# Patient Record
Sex: FEMALE | Race: WHITE | NOT HISPANIC OR LATINO | Employment: FULL TIME | URBAN - METROPOLITAN AREA
[De-identification: names, ages, dates, MRNs, and addresses within clinical notes are randomized per-mention and may not be internally consistent; named-entity substitution may affect disease eponyms.]

---

## 2017-07-31 ENCOUNTER — HOSPITAL ENCOUNTER (OUTPATIENT)
Dept: MAMMOGRAPHY | Facility: CLINIC | Age: 52
Discharge: HOME | End: 2017-07-31
Attending: OBSTETRICS & GYNECOLOGY | Admitting: OBSTETRICS & GYNECOLOGY
Payer: COMMERCIAL

## 2017-07-31 DIAGNOSIS — Z12.31 VISIT FOR SCREENING MAMMOGRAM: ICD-10-CM

## 2017-07-31 PROCEDURE — G0202 SCR MAMMO BI INCL CAD: HCPCS

## 2017-08-10 ENCOUNTER — HOSPITAL ENCOUNTER (OUTPATIENT)
Dept: MAMMOGRAPHY | Facility: CLINIC | Age: 52
Discharge: HOME | End: 2017-08-10
Attending: OBSTETRICS & GYNECOLOGY | Admitting: OBSTETRICS & GYNECOLOGY
Payer: COMMERCIAL

## 2017-08-10 ENCOUNTER — HOSPITAL ENCOUNTER (OUTPATIENT)
Dept: ULTRASOUND IMAGING | Facility: CLINIC | Age: 52
End: 2017-08-10
Attending: OBSTETRICS & GYNECOLOGY
Payer: COMMERCIAL

## 2017-08-10 DIAGNOSIS — R92.8 ABNORMAL MAMMOGRAM: ICD-10-CM

## 2017-08-10 PROCEDURE — G0279 TOMOSYNTHESIS, MAMMO: HCPCS

## 2017-08-10 PROCEDURE — 76642 ULTRASOUND BREAST LIMITED: CPT | Mod: LT

## 2020-06-23 ENCOUNTER — HOSPITAL ENCOUNTER (EMERGENCY)
Facility: CLINIC | Age: 55
Discharge: HOME | End: 2020-06-23
Attending: EMERGENCY MEDICINE | Admitting: EMERGENCY MEDICINE
Payer: COMMERCIAL

## 2020-06-23 VITALS
HEIGHT: 62 IN | DIASTOLIC BLOOD PRESSURE: 111 MMHG | WEIGHT: 160 LBS | BODY MASS INDEX: 29.44 KG/M2 | RESPIRATION RATE: 16 BRPM | OXYGEN SATURATION: 98 % | SYSTOLIC BLOOD PRESSURE: 184 MMHG | TEMPERATURE: 97.5 F

## 2020-06-23 DIAGNOSIS — L25.9 CONTACT DERMATITIS, UNSPECIFIED CONTACT DERMATITIS TYPE, UNSPECIFIED TRIGGER: ICD-10-CM

## 2020-06-23 PROCEDURE — 99282 EMERGENCY DEPT VISIT SF MDM: CPT

## 2020-06-23 RX ORDER — TRIAMCINOLONE ACETONIDE 1 MG/G
CREAM TOPICAL 2 TIMES DAILY
Qty: 30 G | Refills: 1 | Status: SHIPPED | OUTPATIENT
Start: 2020-06-23 | End: 2020-07-03

## 2020-06-23 ASSESSMENT — ENCOUNTER SYMPTOMS
VOMITING: 0
FEVER: 0
DIARRHEA: 0
NAUSEA: 0
SHORTNESS OF BREATH: 0
CHILLS: 0

## 2020-06-23 ASSESSMENT — MIFFLIN-ST. JEOR: SCORE: 1264.01

## 2020-06-23 NOTE — ED AVS SNAPSHOT
[facility] Emergency Department  [address]  [facility] [address]  Phone:  [phone]  Fax:  [phone]                                    Chio Storey   MRN: [id number]    Department:  [facility] Emergency Department   Date of Visit:  6/23/2020           After Visit Summary Signature Page    I have received my discharge instructions, and my questions have been answered. I have discussed any challenges I see with this plan with the nurse or doctor.    ..........................................................................................................................................  Patient/Patient Representative Signature      ..........................................................................................................................................  Patient Representative Print Name and Relationship to Patient    ..................................................               ................................................  Date                                   Time    ..........................................................................................................................................  Reviewed by Signature/Title    ...................................................              ..............................................  Date                                               Time          22EPIC Rev 08/18

## 2020-06-24 NOTE — ED TRIAGE NOTES
Pt presents for evaluation of a rash with wound to right foot for about 1 week. Has been itchy, pt scratched area, which caused an open wound. Tonight, pt noticed small bumps to left foot. Has been in the yard with sandals. 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

## 2020-06-24 NOTE — ED PROVIDER NOTES
"  History     Chief Complaint:    Rash    The history is provided by the patient.      Chio Storey is a 55 year old female who presents with rash.  Patient states that 1 to 2 weeks prior she noted that there was a pruritic rash on the dorsum of her right foot.  The majority of the rash were small erythematous papules but there was a collection of papules together in which there was some degree of fluid accumulation.  Patient tried to \"pop it.\"  There was clear mikhail-colored fluid with no purulence.  The area has remained pruritic.  She now has the same erythematous rash to the dorsum of the left foot.  She placed hydrocortisone cream on the areas twice but no additional doses or medications.  She has been working in her backyard more often but no other obvious new skin irritants.  She denies that there are any rosebushes in her yard.  There are no animals that have scratched or bit her feet.    Allergies:  Sulfa Drugs     Medications:    Biotin    Past Medical History:    Allergic rhinitis  Rosacea     Past Surgical History:    The patient does not have any pertinent past surgical history.    Family History:    Allergies - father, sister, brother    Social History:  Negative for tobacco use.  Negative for alcohol use.  Negative for drug use.  Marital Status:   [4]     Review of Systems   Constitutional: Negative for chills and fever.   Respiratory: Negative for shortness of breath.    Gastrointestinal: Negative for diarrhea, nausea and vomiting.   Skin: Positive for rash.   All other systems reviewed and are negative.      Physical Exam     Patient Vitals for the past 24 hrs:   BP Temp Temp src Heart Rate Resp SpO2 Height Weight   06/23/20 2054 (!) 184/111 97.5  F (36.4  C) Oral 79 16 98 % 1.575 m (5' 2\") 72.6 kg (160 lb)     Physical Exam  Skin:               General:   Pleasant, age appropriate.  Eyes:    Conjunctiva normal  Neck:    Supple, no meningismus.     CV:     Regular rate and rhythm. "      No murmurs, rubs or gallops.       No  lower extremity edema.  PULM:    Clear to auscultation bilateral.       No respiratory distress.      Good air exchange.  ABD:    Soft, non-tender, non-distended.       No rebound, guarding or rigidity.  MSK:     No gross deformity to all four extremities.   LYMPH:   No cervical lymphadenopathy.  NEURO:   Alert, good muscular tone, no atrophy.   Skin:    Warm, dry  Psych:    Mood is good and affect is appropriate.      Emergency Department Course     Emergency Department Course:  Past medical records, nursing notes, and vitals reviewed.    2110: I performed an exam of the patient as documented above.     I personally reviewed the results with the Patient and answered all related questions prior to discharge.     Findings and plan explained to the Patient. Patient discharged home with instructions regarding supportive care, medications, and reasons to return. The importance of close follow-up was reviewed.     Impression & Plan     Medical Decision Making:    Chio Storey is a 55 year old female who presented to the ED with erythematous papules to the dorsum of her feet and ankles after working in her yard.  No evidence of cellulitis, Lyme disease, scabies, fungal infection, sporotrichosis.  Signs are most suggestive of contact dermatitis.  Patient will be discharged home on 10-day supply of triamcinolone.  Ranitidine and diphenhydramine as needed for pruritus.  Return to the ED for any worsening symptoms.      Diagnosis:    ICD-10-CM    1. Contact dermatitis, unspecified contact dermatitis type, unspecified trigger  L25.9      Disposition:  Discharged to home.    Discharge Medications:  Discharge Medication List as of 6/23/2020  9:23 PM      START taking these medications    Details   triamcinolone (KENALOG) 0.1 % external cream Apply topically 2 times daily for 10 days 80 gramsDisp-30 g,R-1Local Print           Scribe Disclosure:  Patricia EMERY am  serving as a scribe at 9:08 PM on 6/23/2020 to document services personally performed by Alexis Montes MD based on my observations and the provider's statements to me.     Patricia Morris  6/23/2020   [facility] EMERGENCY DEPARTMENT       Alexis Montes MD  06/23/20 [id number]

## 2024-05-13 ENCOUNTER — OFFICE VISIT (OUTPATIENT)
Dept: FAMILY MEDICINE | Facility: CLINIC | Age: 59
End: 2024-05-13

## 2024-05-13 VITALS
WEIGHT: 170 LBS | OXYGEN SATURATION: 96 % | HEART RATE: 78 BPM | TEMPERATURE: 98 F | RESPIRATION RATE: 20 BRPM | BODY MASS INDEX: 32.1 KG/M2 | HEIGHT: 61 IN | DIASTOLIC BLOOD PRESSURE: 94 MMHG | SYSTOLIC BLOOD PRESSURE: 122 MMHG

## 2024-05-13 DIAGNOSIS — M25.572 ACUTE LEFT ANKLE PAIN: ICD-10-CM
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
## 2024-05-13 DIAGNOSIS — Z71.89 ACP (ADVANCE CARE PLANNING): Primary | ICD-10-CM

## 2024-05-13 DIAGNOSIS — Z76.89 HEALTH CARE HOME: ICD-10-CM

## 2024-05-13 DIAGNOSIS — D68.51 FACTOR V LEIDEN (H): ICD-10-CM

## 2024-05-13 PROCEDURE — 73610 X-RAY EXAM OF ANKLE: CPT | Mod: LT | Performed by: FAMILY MEDICINE

## 2024-05-13 PROCEDURE — 99204 OFFICE O/P NEW MOD 45 MIN: CPT | Performed by: FAMILY MEDICINE

## 2024-05-13 NOTE — NURSING NOTE
Chief Complaint   Patient presents with    New Patient     New patient to this clinic     Fall     Was out taking dog for a walk and she stepped on something which caused her to fall forward, fell on her hands but noticed that left ankle started hurting, hurts when pushing up on her foot so wondering if she did something to it when she fell that she did not notice right away, has been wearing an ankle brace and does help a little, no swelling, fall happened a couple weeks ago       Pre-visit Screening:  Immunizations:  not up to date - due for tetanus   Colonoscopy:  is due and to be scheduled by patient for later completion  Mammogram: is due and to be scheduled by patient for later completion  Asthma Action Test/Plan:  na  PHQ9:  na  GAD7:  na  Questioned patient about current smoking habits Pt. has never smoked.  Ok to leave detailed message on voice mail for today's visit only yes, phone # [phone] (home) [phone] (work)

## 2024-05-13 NOTE — PROGRESS NOTES
Assessment & Plan   Problem List Items Addressed This Visit          Hematologic    Factor V Leiden (H24)       Other    ACP (advance care planning) - Primary    Health Care Home     Other Visit Diagnoses       Fall, initial encounter        Acute left ankle pain        Relevant Orders    XR Ankle Left G/E 3 Views (Completed)    Orthopedic  Referral - To a Non [facility] Location (Use POS/Location)           1. ACP (advance care planning)      2. Health Care Home      3. Fall, initial encounter      4. Acute left ankle pain  Xrays done, negative. I will have her see ortho, I am concerned about a problem in the joint space, as she has pain at times and feels like there is something in the joint itself and other times it seems to be ok.  - XR Ankle Left G/E 3 Views  - Orthopedic  Referral - To a Non [facility] Location (Use POS/Location)    5. Factor V Leiden (H24)  Followup to discuss further.              FUTURE APPOINTMENTS:       - Follow-up visit with ortho    No follow-ups on file.    Heena Amos MD  [facility]    Subjective     Nursing Notes:   Mitzy Callejas [facility]  5/13/2024  3:34 PM  Signed  Chief Complaint   Patient presents with    New Patient     New patient to this clinic     Fall     Was out taking dog for a walk and she stepped on something which caused her to fall forward, fell on her hands but noticed that left ankle started hurting, hurts when pushing up on her foot so wondering if she did something to it when she fell that she did not notice right away, has been wearing an ankle brace and does help a little, no swelling, fall happened a couple weeks ago       Pre-visit Screening:  Immunizations:  not up to date - due for tetanus   Colonoscopy:  is due and to be scheduled by patient for later completion  Mammogram: is due and to be scheduled by patient for later completion  Asthma Action Test/Plan:  na  PHQ9:  na  GAD7:  na  Questioned patient  "about current smoking habits Pt. has never smoked.  Ok to leave detailed message on voice mail for today's visit only yes, phone # [phone] (home) [phone] (work)         Chio Storey is a 59 year old female who presents to clinic today for the following health issues   HPI     New patient, fall. Daughter got a new dog, was out for a walk and not sure what happened, fell forward and fell onto her hand, this happened 2 weeks ago. But then about a week later felt pain in the left ankle, felt like there was a ball under the skin. No bruising or swelling. Can walk on it but sometimes notices isn't putting full weight on it. And sometimes walking on the outside of the foot. There is a dull pain in the anterior ankle. Sometimes it hurts more than other times.         Review of Systems   Constitutional, HEENT, cardiovascular, pulmonary, gi and gu systems are negative, except as otherwise noted.      Objective    BP (!) 122/94 (BP Location: Right arm, Patient Position: Sitting, Cuff Size: Adult Large)   Pulse 78   Temp 98  F (36.7  C) (Temporal)   Resp 20   Ht 1.549 m (5' 1\")   Wt 77.1 kg (170 lb)   LMP 07/28/1999   SpO2 96%   BMI 32.12 kg/m    Body mass index is 32.12 kg/m .  Physical Exam   GENERAL: alert and no distress  MS: no gross musculoskeletal defects noted, no edema  NEURO: Normal strength and tone, mentation intact and speech normal  PSYCH: mentation appears normal, affect normal/bright    Left ankle no obvious swelling, bruising or deformity. Normal ankle rom.    Results for orders placed or performed in visit on 05/13/24   XR Ankle Left G/E 3 Views     Status: None    Narrative    Radiologist Consultation/:   " Fax:  [phone]  [phone]  _____________________________________________________________________________________________________________________________________________________________________________________________________________________________________________________________________________________________________________________________________________________________________________________________________________________________________________________________________________________________________  PATIENT NAME: SAM MAGUIRE, PURVI A  YOB: 1965 Age: 59 ACCESSION NUMBER: [id number]  SEX: F ORDERING PROVIDER: Heena Amos  FACILITY: [facility] PRIMARY PROVIDER:  PATIENT ID: [id number] INTERESTED PARTY:  Page 1 of 1  _________________________________________________________________________________________________________________________________________________________________________________________________________________________________________________________________________________________________________________________________________________________________________________________  EXAM: XRAY ANKLE-3V OR MORE-LT  LOCATION: [facility]  DATE: 5/13/2024  INDICATION: Ankle pain  COMPARISON: None.  IMPRESSION: Normal joint spaces and alignment. No fracture.  SIGNED BY: Tino Merlos MD 5/14/2024 11:01 AM

## 2024-06-17 PROBLEM — Z76.89 HEALTH CARE HOME: Status: RESOLVED | Noted: 2024-05-13 | Resolved: 2024-06-17

## 2024-07-17 ENCOUNTER — OFFICE VISIT (OUTPATIENT)
Dept: FAMILY MEDICINE | Facility: CLINIC | Age: 59
End: 2024-07-17

## 2024-07-17 VITALS
DIASTOLIC BLOOD PRESSURE: 86 MMHG | TEMPERATURE: 97.9 F | BODY MASS INDEX: 31.37 KG/M2 | SYSTOLIC BLOOD PRESSURE: 118 MMHG | OXYGEN SATURATION: 96 % | WEIGHT: 166 LBS | HEART RATE: 67 BPM

## 2024-07-17 DIAGNOSIS — L23.7 CONTACT DERMATITIS DUE TO POISON IVY: Primary | ICD-10-CM

## 2024-07-17 PROCEDURE — 99213 OFFICE O/P EST LOW 20 MIN: CPT | Performed by: PHYSICIAN ASSISTANT

## 2024-07-17 RX ORDER — CLOBETASOL PROPIONATE 0.5 MG/G
OINTMENT TOPICAL 2 TIMES DAILY
Qty: 30 G | Refills: 0 | Status: SHIPPED | OUTPATIENT
Start: 2024-07-17

## 2024-07-17 NOTE — PROGRESS NOTES
Assessment & Plan     Contact dermatitis due to poison ivy-strongly suspicious for poison ivy, likely from her dog given no obvious exposure. Will treat with high dose steroid and await improvement  Call with any infection concerns  Calamine lotion, oatmeal baths PRN  - clobetasol (TEMOVATE) 0.05 % external ointment  Dispense: 30 g; Refill: 0        Follow up as needed    No follow-ups on file.    Karina Barroso is a 59 year old, presenting for the following health issues:  No chief complaint on file.    HPI     Pt had COVID 2 weeks ago.   [place] to her cabin near 4th [place]. No known exposures to anything.   Dog was running around the woods, possibly exposed to poison ivy    Rash  Onset/Duration: 6 days  Description  Location: First started R arm, moved to L arm and L thigh  Character: blotchy, raised, draining yellow fluid, red, blistering  Itching: severe  Intensity:  moderate  Progression of Symptoms:  worsening  Accompanying signs and symptoms:   Fever: No  Body aches or joint pain: No  Sore throat symptoms: No  Recent cold symptoms: No  History:           Previous episodes of similar rash: 2020  New exposures:  None  Recent travel: No  Exposure to similar rash: No  Precipitating or alleviating factors: Benadryl lotion (helped a little)          Review of Systems  Constitutional, neuro, ENT, endocrine, pulmonary, cardiac, gastrointestinal, genitourinary, musculoskeletal, integument and psychiatric systems are negative, except as otherwise noted.      Objective    LMP 07/28/1999   There is no height or weight on file to calculate BMI.  Physical Exam   GENERAL: alert and no distress  NECK: no adenopathy, no asymmetry, masses, or scars  RESP: lungs clear to auscultation - no rales, rhonchi or wheezes  CV: regular rate and rhythm, normal S1 S2, no S3 or S4, no murmur, click or rub, no peripheral edema  ABDOMEN: soft, nontender, no hepatosplenomegaly, no masses and bowel sounds normal  MS: no gross  musculoskeletal defects noted, no edema  PSYCH: mentation appears normal, affect normal/bright  Skin: R arm: 8cm x 5cm lesion, erythematous base, raised yellow blisters noted with scratch marks  L arm: 1cm x 6cm lesion noted, similar in appearance to R arm          Signed Electronically by: Dwaine Michele PA-C

## 2024-07-17 NOTE — NURSING NOTE
Chief Complaint   Patient presents with    Rash     Rash on both arms and small rash on inside of her thigh, rash is small red raised bumps in clusters that are very itchy, these small bumps are turning into blisters, some blisters are draining, started on 07/11 and is spreading     Pre-visit Screening:  Immunizations:  not up to date   Colonoscopy:  is due and to be scheduled by patient for later completion  Mammogram: is due and to be scheduled by patient for later completion  Asthma Action Test/Plan:  NA  PHQ9:  NA  GAD7:  NA  Questioned patient about current smoking habits Pt. has never smoked.  Ok to leave detailed message on voice mail for today's visit only Yes, phone # [phone]

## 2025-01-17 NOTE — PROGRESS NOTES
"Assessment & Plan   Problem List Items Addressed This Visit    None  Visit Diagnoses       Vertigo    -  Primary    Essential hypertension               1. Vertigo (Primary)  This has resolved. She has home exercises, will followup on this as needed.    2. Essential hypertension  High, get home blood pressure cuff. Goal is less than 130/80. Followup on this in a couple of weeks.            BMI  Estimated body mass index is 31.37 kg/m  as calculated from the following:    Height as of 5/13/24: 1.549 m (5' 1\").    Weight as of 7/17/24: 75.3 kg (166 lb).         FUTURE APPOINTMENTS:       - Follow-up visit for a physical and to followup on blood pressure. We manage her chronic medical care.    No follow-ups on file.    Heena Amos MD  [facility]    Subjective     Nursing Notes:   Maeve Savage, [facility]  1/20/2025  1:22 PM  Signed  Chief Complaint   Patient presents with    ER F/U     [facility] and [facility], she was at work when she looked over at her co-worker when she became dizzy, she felt warm and BP was elevated, they advised this was vertigo; prescribed meclizine and zofran, she saw PT at Thursday, feeling better today, BP has continued to be elevated           Chio Storey is a 60 year old female who presents to clinic today for the following health issues   HPI     Here for ER followup. Had dizziness at work, vertigo. She has now gone to Physical therapy and is feeling better. But the blood pressure is still high. Normal bp in July when she was here.    Has home exercises to do and is feeling better.  Mom had a stroke and then fell and hit her head. Then she passed away.        Review of Systems   Constitutional, HEENT, cardiovascular, pulmonary, gi and gu systems are negative, except as otherwise noted.      Objective    BP (!) 158/86 (BP Location: Right arm, Patient Position: Sitting, Cuff Size: Adult Large)   Pulse 67   Temp 98.3  F (36.8  C) (Temporal)  "  LMP 07/28/1999   SpO2 96%   There is no height or weight on file to calculate BMI.  Physical Exam   GENERAL: alert and no distress  MS: no gross musculoskeletal defects noted, no edema  PSYCH: mentation appears normal, affect normal/bright    No results found for any visits on 01/20/25.

## 2025-01-20 ENCOUNTER — OFFICE VISIT (OUTPATIENT)
Dept: FAMILY MEDICINE | Facility: CLINIC | Age: 60
End: 2025-01-20

## 2025-01-20 ENCOUNTER — TRANSFERRED RECORDS (OUTPATIENT)
Dept: FAMILY MEDICINE | Facility: CLINIC | Age: 60
End: 2025-01-20

## 2025-01-20 VITALS
SYSTOLIC BLOOD PRESSURE: 158 MMHG | DIASTOLIC BLOOD PRESSURE: 86 MMHG | OXYGEN SATURATION: 96 % | HEART RATE: 67 BPM | TEMPERATURE: 98.3 F

## 2025-01-20 DIAGNOSIS — R42 VERTIGO: Primary | ICD-10-CM

## 2025-01-20 DIAGNOSIS — I10 ESSENTIAL HYPERTENSION: ICD-10-CM

## 2025-01-20 PROCEDURE — G2211 COMPLEX E/M VISIT ADD ON: HCPCS | Performed by: FAMILY MEDICINE

## 2025-01-20 PROCEDURE — 99214 OFFICE O/P EST MOD 30 MIN: CPT | Performed by: FAMILY MEDICINE

## 2025-01-20 NOTE — NURSING NOTE
Chief Complaint   Patient presents with    ER F/U     [facility], she was at work when she looked over at her co-worker when she became dizzy, she felt warm and BP was elevated, they advised this was vertigo; prescribed meclizine and zofran, she saw PT at Thursday, feeling better today, BP has continued to be elevated

## 2025-05-19 ENCOUNTER — OFFICE VISIT (OUTPATIENT)
Dept: FAMILY MEDICINE | Facility: CLINIC | Age: 60
End: 2025-05-19

## 2025-05-19 VITALS
SYSTOLIC BLOOD PRESSURE: 124 MMHG | OXYGEN SATURATION: 97 % | DIASTOLIC BLOOD PRESSURE: 92 MMHG | HEART RATE: 62 BPM | WEIGHT: 158 LBS | BODY MASS INDEX: 29.85 KG/M2

## 2025-05-19 DIAGNOSIS — M54.2 NECK PAIN ON LEFT SIDE: Primary | ICD-10-CM

## 2025-05-19 PROCEDURE — 99213 OFFICE O/P EST LOW 20 MIN: CPT | Performed by: STUDENT IN AN ORGANIZED HEALTH CARE EDUCATION/TRAINING PROGRAM

## 2025-05-19 PROCEDURE — 72040 X-RAY EXAM NECK SPINE 2-3 VW: CPT | Performed by: STUDENT IN AN ORGANIZED HEALTH CARE EDUCATION/TRAINING PROGRAM

## 2025-05-19 PROCEDURE — G2211 COMPLEX E/M VISIT ADD ON: HCPCS | Performed by: STUDENT IN AN ORGANIZED HEALTH CARE EDUCATION/TRAINING PROGRAM

## 2025-05-19 RX ORDER — MELOXICAM 15 MG/1
15 TABLET ORAL DAILY PRN
Qty: 30 TABLET | Refills: 0 | Status: SHIPPED | OUTPATIENT
Start: 2025-05-19

## 2025-05-19 NOTE — PROGRESS NOTES
ASSESSMENT & PLAN      ICD-10-CM    1. Neck pain on left side  M54.2 XR Cervical Spine 2/3 Views     meloxicam (MOBIC) 15 MG tablet     Physical Therapy  Referral - To a [facility] Location (Use POS/Location)         Left sided neck/scapular pain  XRs obtained and reviewed with patient.   Hx and exam most c/w myofascial pain vs cervical radiculitis.   Reviewed options for treatment and/or further eval, agreed on plan below. R/b nsaids reviewed.    Patient Instructions   Tylenol 1000mg 3x/day  Meloxicam one pill daily as needed    Heating pad    Physical / Occupational Therapy  [facility]   [address] [place] [address]  [phone] -- appt line    Please keep me updated over the next several weeks    Reasons to follow-up sooner reviewed    Dc Vallejo MD, [facility]      -----    SUBJECTIVE  Chio Storey is a/an 60 year old female who is seen for evaluation of     Chief Complaint   Patient presents with    Consult For     Upper back and neck pain, feels more pain on the left side near shoulder blade area, certain movements cause the pain to become worse, feels muscles are tense, has been taking OTC medications and thought it was getting better but pain came back again, has been around for a total of 3 weeks now, no known injury      The patient is seen by themselves.  RHD  Date of Onset: 2-3 weeks  Mechanism of injury: Reports insidious onset without acute precipitating event.  Location of Pain: left side of neck and down through scapular area. No LUE sx. No R sided sx.   Worsened by: neck movements/turning head  Better with: tylenol  Treatments tried: rest/activity avoidance  Associated symptoms: no distal numbness or tingling; denies swelling or warmth  Orthopedic/Surgical history: no neck or shoulder hx   Patient's PMH, PSH, and family hx reviewed.      OBJECTIVE:  BP (!) 124/92 (BP Location: Left arm, Patient Position:  Sitting, Cuff Size: Adult Large)   Pulse 62   Wt 71.7 kg (158 lb)   LMP 07/28/1999   SpO2 97%   BMI 29.85 kg/m     Alert, NAD  NC/AT  Sclerae anicteric  Regular  Resp nonlabored  Skin warm and dry  No focal neuro deficits. Speech intact. Normal gait.  Appropriate affect  Full cervical ROM, pain reported with ext/flex/leftward rotation/bilateral lat flex  No midline ttp  Neg spurling  5/5 str BUE, SILT BUE    RADIOLOGY:  Results for orders placed or performed in visit on 05/19/25   XR Cervical Spine 2/3 Views     Status: None    Narrative    Radiologist Consultation/:   Fax:  [phone]  _____________________________________________________________________________________________________________________________________________________________________________________________________________________________________________________________________________________________________________________________________________________________________________________________________________________________________________________________________________________________________  PATIENT NAME: SAM MAGUIRE, PURVI A  YOB: 1965 Age: 60 ACCESSION NUMBER: [id number]  SEX: F ORDERING PROVIDER: Heena Bette  FACILITY: [facility] PRIMARY PROVIDER:  PATIENT ID: [id number] INTERESTED PARTY:  Page 1 of 1  _________________________________________________________________________________________________________________________________________________________________________________________________________________________________________________________________________________________________________________________________________________________________________________________  EXAM: XRAY CERVICAL SPINE-3V OR LESS  LOCATION: [facility]  DATE: 5/19/2025  INDICATION: Nack pain  COMPARISON: None.  IMPRESSION:   Anterolisthesis of C5 on C6  measuring 1 mm. The vertebral bodies of the cervical spine otherwise have  normal stature and alignment. The disc spaces are well-maintained. Moderate multilevel facet arthropathy. No  prevertebral soft tissue swelling. The partially imaged lung apices are unremarkable. Soft tissues  unremarkable.  SIGNED BY: Monty Murdock MD 5/20/2025 10:23 AM

## 2025-05-19 NOTE — PATIENT INSTRUCTIONS
Tylenol 1000mg 3x/day  Meloxicam one pill daily as needed    Heating pad    Physical / Occupational Therapy  [facility]   [address] [place] [address]  [phone] -- appt line    Please keep me updated over the next several weeks

## 2025-05-19 NOTE — NURSING NOTE
Chief Complaint   Patient presents with    Consult For     Upper back and neck pain, feels more pain on the left side near shoulder blade area, certain movements cause the pain to become worse, feels muscles are tense, has been taking OTC medications and thought it was getting better but pain came back again, has been around for a total of 3 weeks now, no known injury      Pre-visit Screening:  Immunizations:  not up to date - due for tdap and prevnar   Colonoscopy:  is due and to be scheduled by patient for later completion  Mammogram: is due and to be scheduled by patient for later completion  Asthma Action Test/Plan:  na  PHQ9:  na  GAD7:  na  Questioned patient about current smoking habits Pt. has never smoked.  Ok to leave detailed message on voice mail for today's visit only yes, phone # [phone] (home) [phone] (work)

## 2025-06-02 ENCOUNTER — OFFICE VISIT (OUTPATIENT)
Dept: FAMILY MEDICINE | Facility: CLINIC | Age: 60
End: 2025-06-02

## 2025-06-02 VITALS
BODY MASS INDEX: 29.66 KG/M2 | TEMPERATURE: 97.9 F | OXYGEN SATURATION: 97 % | SYSTOLIC BLOOD PRESSURE: 128 MMHG | DIASTOLIC BLOOD PRESSURE: 88 MMHG | HEART RATE: 68 BPM | WEIGHT: 157 LBS

## 2025-06-02 DIAGNOSIS — M79.671 RIGHT FOOT PAIN: Primary | ICD-10-CM

## 2025-06-02 PROCEDURE — 73630 X-RAY EXAM OF FOOT: CPT | Mod: RT | Performed by: FAMILY MEDICINE

## 2025-06-02 PROCEDURE — G2211 COMPLEX E/M VISIT ADD ON: HCPCS | Performed by: FAMILY MEDICINE

## 2025-06-02 PROCEDURE — 99213 OFFICE O/P EST LOW 20 MIN: CPT | Performed by: FAMILY MEDICINE

## 2025-06-02 NOTE — PROGRESS NOTES
Assessment & Plan   Problem List Items Addressed This Visit    None  Visit Diagnoses         Right foot pain    -  Primary    Relevant Orders    X-ray rt Foot G/E 3 vws*    Miscellaneous DME Supply Order (Use only if a more specific DME order does not already exist)             1. Right foot pain (Primary)  Right foot pain and injury, fracture at base of 5th metatarsal, cam walker, ice and elevate.  - X-ray rt Foot G/E 3 vws*  - Miscellaneous DME Supply Order (Use only if a more specific DME order does not already exist)            FUTURE APPOINTMENTS:       - Follow-up visit in 1-2 weeks. We manage her chronic medical care.    No follow-ups on file.    Heena Amos MD  [facility]    Subjective     Nursing Notes:   Maeve Savage, FRIDA  6/2/2025  2:34 PM  Signed  Chief Complaint   Patient presents with    Foot Pain     Saturday she tripped on step and hit right foot, foot was bruised and swollen, limping when walking due to pain      Pre-visit Screening:  Immunizations:  not up to date - tdap and prevnar 20 due, shingrix at pharmacy  Colonoscopy:  is due and to be scheduled by patient for later completion  Mammogram: is due and to be scheduled by patient for later completion  Asthma Action Test/Plan:  NA  PHQ9:  NA  GAD7:  NA  Questioned patient about current smoking habits Pt. has never smoked.  Ok to leave detailed message on voice mail for today's visit only Yes, phone # [phone]       Chio Storey is a 60 year old female who presents to clinic today for the following health issues   HPI     Happened Saturday, was walking out of her house, twisted her foot, laterally. Now is very painful, bruised and swollen.   Pain is at distal metatarsals, pain at base of 5th when pressure going up and down stairs. Used her daughters cam walker but it wasn't the right size, but this helped. Doesn't feel that crutches would help her.         Review of Systems   Constitutional, HEENT,  cardiovascular, pulmonary, gi and gu systems are negative, except as otherwise noted.      Objective    /88 (BP Location: Right arm, Patient Position: Sitting, Cuff Size: Adult Large)   Pulse 68   Temp 97.9  F (36.6  C) (Temporal)   Wt 71.2 kg (157 lb)   LMP 07/28/1999   SpO2 97%   BMI 29.66 kg/m    Body mass index is 29.66 kg/m .  Physical Exam   GENERAL: alert and no distress  MS: no gross musculoskeletal defects noted, no edema  PSYCH: mentation appears normal, affect normal/bright  Right foot generalized swelling and bruising. Normal ankle rom and non tender to palpation, some tenderness to distal metatarsals 2,3 4  Also tender to palpation proximal 5th metatarsal    No results found for any visits on 06/02/25.    Xray foot sent for overread. Fracture base of 5th m

## 2025-06-02 NOTE — NURSING NOTE
Chief Complaint   Patient presents with    Foot Pain     Saturday she tripped on step and hit right foot, foot was bruised and swollen, limping when walking due to pain      Pre-visit Screening:  Immunizations:  not up to date - tdap and prevnar 20 due, shingrix at pharmacy  Colonoscopy:  is due and to be scheduled by patient for later completion  Mammogram: is due and to be scheduled by patient for later completion  Asthma Action Test/Plan:  NA  PHQ9:  NA  GAD7:  NA  Questioned patient about current smoking habits Pt. has never smoked.  Ok to leave detailed message on voice mail for today's visit only Yes, phone # [phone]

## 2025-06-04 ENCOUNTER — TELEPHONE (OUTPATIENT)
Dept: FAMILY MEDICINE | Facility: CLINIC | Age: 60
End: 2025-06-04

## 2025-06-04 ENCOUNTER — RESULTS FOLLOW-UP (OUTPATIENT)
Dept: FAMILY MEDICINE | Facility: CLINIC | Age: 60
End: 2025-06-04

## 2025-06-18 ENCOUNTER — RESULTS FOLLOW-UP (OUTPATIENT)
Dept: FAMILY MEDICINE | Facility: CLINIC | Age: 60
End: 2025-06-18

## 2025-06-19 ENCOUNTER — OFFICE VISIT (OUTPATIENT)
Dept: FAMILY MEDICINE | Facility: CLINIC | Age: 60
End: 2025-06-19

## 2025-06-19 VITALS
DIASTOLIC BLOOD PRESSURE: 84 MMHG | HEIGHT: 62 IN | OXYGEN SATURATION: 97 % | WEIGHT: 158.8 LBS | TEMPERATURE: 97.8 F | SYSTOLIC BLOOD PRESSURE: 132 MMHG | HEART RATE: 69 BPM | BODY MASS INDEX: 29.22 KG/M2

## 2025-06-19 DIAGNOSIS — Z11.59 NEED FOR HEPATITIS C SCREENING TEST: ICD-10-CM

## 2025-06-19 DIAGNOSIS — S00.532A HEMATOMA OF TONGUE: ICD-10-CM

## 2025-06-19 DIAGNOSIS — Z13.228 SCREENING FOR METABOLIC DISORDER: ICD-10-CM

## 2025-06-19 DIAGNOSIS — Z00.00 ENCOUNTER FOR GENERAL MEDICAL EXAMINATION: Primary | ICD-10-CM

## 2025-06-19 DIAGNOSIS — Z13.220 SCREENING FOR LIPID DISORDERS: ICD-10-CM

## 2025-06-19 LAB
BUN SERPL-MCNC: 20 MG/DL (ref 7–25)
BUN/CREATININE RATIO: 23 (ref 6–32)
CALCIUM SERPL-MCNC: 9.2 MG/DL (ref 8.6–10.3)
CHLORIDE SERPLBLD-SCNC: 102.9 MMOL/L (ref 98–110)
CHOLEST SERPL-MCNC: 215 MG/DL (ref 0–199)
CHOLEST/HDLC SERPL: 4 {RATIO} (ref 0–5)
CO2 SERPL-SCNC: 29.6 MMOL/L (ref 20–32)
CREAT SERPL-MCNC: 0.88 MG/DL (ref 0.6–1.3)
GLUCOSE SERPL-MCNC: 81 MG/DL (ref 60–99)
HDLC SERPL-MCNC: 55 MG/DL (ref 40–150)
LDLC SERPL CALC-MCNC: 130 MG/DL (ref 0–129)
POTASSIUM SERPL-SCNC: 4.05 MMOL/L (ref 3.5–5.3)
SODIUM SERPL-SCNC: 139.9 MMOL/L (ref 135–146)
TRIGL SERPL-MCNC: 148 MG/DL (ref 0–149)

## 2025-06-19 NOTE — NURSING NOTE
Chief Complaint   Patient presents with    Physical     Fasting, came in for annual, wants to just check a cut on lip, not concerned just wants to check it out. Also wants to check right side of collar bone, seems to be larger than the other, wants to make sure here is nothing to worry about.     Pre-visit Screening:  Immunizations:  not up to date - covid, Zoster  Colonoscopy:  recently resulted is reviewed with the patient  Mammogram: is due and to be scheduled by patient for later completion  Asthma Action Test/Plan:  na  PHQ9:  phq2 done today  GAD7:  na  Questioned patient about current smoking habits Pt. has never smoked.  Ok to leave detailed message on voice mail for today's visit only yes, phone # [phone] (home)

## 2025-06-19 NOTE — PROGRESS NOTES
Chief Complaint:  Physical Exam    SUBJECTIVE:   Chio Storey is a 60 year old female presents for routine health maintenance.    Current concerns:   Collarbone:  Feels like right collarbone more pronounced than left. Has been like that as long as she can remember. Not changing. Non-tender.    Dark on tongue:  Pt has dark purple area on left tip of tongue for several years. Not painful. Stable in size.     Menses are absent    Patient's last menstrual period was 07/28/1999.    Was last Pap smear normal: Yes  Due for mammogram:  Yes    Body mass index is 29.04 kg/m .    Present exercise habits:  3-5 times/week, walking dog, but currently in boot.  Present dietary habits:  eats regular meals and follows a balanced nutrition diet    Calcium intake: 3 servings daily  Vit D intake: is taking supplement    Is the patient a smoker? No  If yes, smoking cessation advised and counseling provided.     Cardiovascular risk factors: none    Over the past few weeks, have you felt down or depressed? Little interest or pleasure in doing things? No concerns.     If in a relationship are there any Domestic violence concern: Not in a relationship    Last dental appointment:  last year  Last optical appointment:  last year    Was the patient born between [id number] and has not had Hep C testing?  Yes, test will be ordered today    I have reviewed the following histories: Past Medical History, Past Surgical History, Social History, Family History, Problem List, Medication List and Allergies    Past Medical History:   Diagnosis Date    Allergic rhinitis     Factor V Leiden 05/13/2024    Rosacea      Family History   Problem Relation Age of Onset    Bladder Cancer Mother     Cerebrovascular Disease Mother     Prostate Cancer Father     Factor V Leiden deficiency Brother     Ovarian Cancer Paternal Grandmother         ovarian    Heart Disease Paternal Grandfather     Factor V Leiden deficiency Daughter      Social History      Socioeconomic History    Marital status:      Spouse name: Not on file    Number of children: 2    Years of education: 16    Highest education level: Not on file   Occupational History    Occupation: Homemaker   Tobacco Use    Smoking status: Never     Passive exposure: Never    Smokeless tobacco: Never   Substance and Sexual Activity    Alcohol use: No    Drug use: No    Sexual activity: Not Currently   Other Topics Concern     Service Not Asked    Blood Transfusions Not Asked    Caffeine Concern Not Asked    Occupational Exposure Not Asked    Hobby Hazards Not Asked    Sleep Concern Not Asked    Stress Concern Not Asked    Weight Concern Not Asked    Special Diet Not Asked    Back Care Not Asked    Exercise Yes    Bike Helmet Not Asked    Seat Belt Yes    Self-Exams Yes   Social History Narrative    Not on file     Social Drivers of Health     Financial Resource Strain: Not on file   Food Insecurity: Not on file   Transportation Needs: Not on file   Physical Activity: Not on file   Stress: Not on file   Social Connections: Not on file   Interpersonal Safety: Not on file   Housing Stability: Not on file     Past Surgical History:   Procedure Laterality Date    NO HISTORY OF SURGERY         ROS:  E/M: NEGATIVE for ear, nose, mouth and throat problems  R: NEGATIVE for significant/chronic cough or SOB  CV: NEGATIVE for chest pain or palpitations  GI: NEGATIVE for abdominal pain, chronic diarrhea or constipation  :  NEGATIVE for dysuria, hematuria or vaginal discharge. No sexual health concerns.       Current Outpatient Medications   Medication Sig Dispense Refill    BIOTIN  mg      meloxicam (MOBIC) 15 MG tablet Take 1 tablet (15 mg) by mouth daily as needed for moderate pain. 30 tablet 0    MULTIVITAMIN OR None Entered      VITAMIN E 400 UNIT OR CAPS None Entered       No current facility-administered medications for this visit.       Patient Active Problem List    Diagnosis Date Noted     "ACP (advance care planning) 05/13/2024     Priority: Medium    Health Care Home 05/13/2024     Priority: Medium    Factor V Leiden 05/13/2024     Priority: Medium    Allergic rhinitis      Priority: Medium     Problem list name updated by automated process. Provider to review and confirm  [employer] Update utility      Rosacea      Priority: Medium       OBJECTIVE:  /84 (BP Location: Left arm, Patient Position: Sitting, Cuff Size: Adult Large)   Pulse 69   Temp 97.8  F (36.6  C) (Temporal)   Ht 1.575 m (5' 2\")   Wt 72 kg (158 lb 12.8 oz)   LMP 07/28/1999   SpO2 97%   BMI 29.04 kg/m        General: 60 year old female who appears her stated age. Vital signs noted.  Head: Normocephalic  Eyes: pupils equal round reactive to light and accomodation, extra ocular movements intact  Ears: external canals and TMs free of abnormalities  Nose: patent, without mucosal abnormalities  Mouth and throat: without erythema or lesions of the mucosa. Purple deep firm cyst 1 cm in diameter left distal tip of tongue. Non-tender.   Neck: supple, without adenopathy or thyromegaly  Lungs: clear to auscultation, no wheezing or crackles  Breasts: Goes to OBGYN  Cv: regular rate and rhythm, normal s1 and s2 without murmur or click  Abd: soft, non-tender, no masses, no hepatomegaly or splenomegaly.   (female): Goes to OBGYN  Ms: normal muscle tone & symmetry. Right medial clavicle with firm, bony prominence R>L.   Skin: clear to inspection and with no palpable abnormalities.  Neuro: sensation and motor function grossly intact; cranial nerves without obvious abnormalities.      ASSESSMENT/PLAN:    Encounter for general medical examination  Goldie is doing relatively well today. Will update fasting labs and complete Hep C screening. She is up to date on colonoscopy per pt, not due until 63, but I will work on getting record. Plans to update Pap and mammo through OBGYN. Tongue finding suspicious for tongue hematoma, but recommended confirm " "with dentist. Medial aspect of right clavicle more pronounced than left, very firm/bony, most consistent with anatomic variant. Return if changing.     Screening for lipid disorders  - VENOUS COLLECTION  - Lipid Panel ([facility])    Screening for metabolic disorder  - VENOUS COLLECTION  - Basic Metabolic Panel ([facility])    Need for hepatitis C screening test  - VENOUS COLLECTION  - HEPATITIS C ANTIBODY ([facility])    Hematoma of tongue     reports that she has never smoked. She has never been exposed to tobacco smoke. She has never used smokeless tobacco.    Estimated body mass index is 29.04 kg/m  as calculated from the following:    Height as of this encounter: 1.575 m (5' 2\").    Weight as of this encounter: 72 kg (158 lb 12.8 oz).  Weight management plan: Discussed healthy diet and exercise guidelines    Labs pending:      Fasting glucose      Fasting lipids  Meds Suggested:      Vitamin D       Calcium  Tests Recommended:      Regular Dental Examinations        Eye exam        Mammogram yearly  Behavior Modifications:       Cardiovascular exercise 3 times per week--enough to get your Target Heart rate  Immunizations suggested:  Other recommendations:     BMI noted and discussed      Regular breast exam     Encouraged My Chart    Counseling Resources:  ATP IV Guidelines  Pooled Cohorts Equation Calculator  Breast Cancer Risk Calculator  FRAX Risk Assessment  ICSI Preventive Guidelines  Dietary Guidelines for Americans, 2010  [employer] My[employer]ate      Milla Sheppard PA-C  6/19/2025    "